# Patient Record
Sex: MALE | Race: WHITE | HISPANIC OR LATINO
[De-identification: names, ages, dates, MRNs, and addresses within clinical notes are randomized per-mention and may not be internally consistent; named-entity substitution may affect disease eponyms.]

---

## 2020-12-09 ENCOUNTER — OFFICE VISIT (OUTPATIENT)
Age: 55
End: 2020-12-09

## 2020-12-10 ENCOUNTER — TELEPHONE ENCOUNTER (OUTPATIENT)
Dept: URBAN - METROPOLITAN AREA CLINIC 9 | Facility: CLINIC | Age: 55
End: 2020-12-10

## 2021-01-20 ENCOUNTER — APPOINTMENT (RX ONLY)
Dept: URBAN - METROPOLITAN AREA CLINIC 147 | Facility: CLINIC | Age: 56
Setting detail: DERMATOLOGY
End: 2021-01-20

## 2021-01-20 VITALS — TEMPERATURE: 97 F

## 2021-01-20 DIAGNOSIS — L82.0 INFLAMED SEBORRHEIC KERATOSIS: ICD-10-CM

## 2021-01-20 DIAGNOSIS — L73.8 OTHER SPECIFIED FOLLICULAR DISORDERS: ICD-10-CM

## 2021-01-20 PROCEDURE — 99202 OFFICE O/P NEW SF 15 MIN: CPT | Mod: 25

## 2021-01-20 PROCEDURE — 17110 DESTRUCTION B9 LES UP TO 14: CPT

## 2021-01-20 PROCEDURE — ? COUNSELING

## 2021-01-20 PROCEDURE — ? LIQUID NITROGEN

## 2021-01-20 ASSESSMENT — LOCATION SIMPLE DESCRIPTION DERM
LOCATION SIMPLE: RIGHT FOREHEAD
LOCATION SIMPLE: SCALP

## 2021-01-20 ASSESSMENT — PAIN INTENSITY VAS: HOW INTENSE IS YOUR PAIN 0 BEING NO PAIN, 10 BEING THE MOST SEVERE PAIN POSSIBLE?: 3/10 PAIN

## 2021-01-20 ASSESSMENT — LOCATION DETAILED DESCRIPTION DERM
LOCATION DETAILED: RIGHT CENTRAL FRONTAL SCALP
LOCATION DETAILED: RIGHT MEDIAL FOREHEAD

## 2021-01-20 ASSESSMENT — LOCATION ZONE DERM
LOCATION ZONE: SCALP
LOCATION ZONE: FACE

## 2021-01-27 ENCOUNTER — OFFICE VISIT (OUTPATIENT)
Dept: URBAN - METROPOLITAN AREA SURGERY CENTER 5 | Facility: SURGERY CENTER | Age: 56
End: 2021-01-27

## 2021-02-15 ENCOUNTER — APPOINTMENT (RX ONLY)
Dept: URBAN - METROPOLITAN AREA CLINIC 148 | Facility: CLINIC | Age: 56
Setting detail: DERMATOLOGY
End: 2021-02-15

## 2021-02-15 VITALS — TEMPERATURE: 97.7 F

## 2021-02-15 DIAGNOSIS — L82.0 INFLAMED SEBORRHEIC KERATOSIS: ICD-10-CM

## 2021-02-15 PROCEDURE — 17110 DESTRUCTION B9 LES UP TO 14: CPT

## 2021-02-15 PROCEDURE — ? LIQUID NITROGEN

## 2021-02-15 PROCEDURE — ? ADDITIONAL NOTES

## 2021-02-15 ASSESSMENT — LOCATION SIMPLE DESCRIPTION DERM: LOCATION SIMPLE: RIGHT TEMPLE

## 2021-02-15 ASSESSMENT — LOCATION DETAILED DESCRIPTION DERM: LOCATION DETAILED: RIGHT LATERAL TEMPLE

## 2021-02-15 ASSESSMENT — LOCATION ZONE DERM: LOCATION ZONE: FACE

## 2021-02-15 NOTE — PROCEDURE: ADDITIONAL NOTES
Additional Notes: Patient consent was obtained to proceed with the visit and recommended plan of care after discussion of all risks and benefits, including the risks of COVID-19 exposure.
Detail Level: Simple
Render Risk Assessment In Note?: yes
Additional Notes: The inferior poll of this lesion was retreated to take because part of it remains
Detail Level: Detailed

## 2022-07-09 ENCOUNTER — TELEPHONE ENCOUNTER (OUTPATIENT)
Dept: URBAN - METROPOLITAN AREA CLINIC 121 | Facility: CLINIC | Age: 57
End: 2022-07-09

## 2022-07-10 ENCOUNTER — TELEPHONE ENCOUNTER (OUTPATIENT)
Dept: URBAN - METROPOLITAN AREA CLINIC 121 | Facility: CLINIC | Age: 57
End: 2022-07-10

## 2022-07-30 ENCOUNTER — TELEPHONE ENCOUNTER (OUTPATIENT)
Age: 57
End: 2022-07-30

## 2022-07-30 RX ORDER — CHOLESTYRAMINE 4 G/9G
1 (ONE) POWDER, FOR SUSPENSION ORAL DAILY
Qty: 0 | Refills: 16 | OUTPATIENT
Start: 2017-11-28 | End: 2020-12-09

## 2022-07-30 RX ORDER — CHOLESTYRAMINE 4 G/9G
1 (ONE) POWDER, FOR SUSPENSION ORAL
Qty: 0 | Refills: 5 | OUTPATIENT
Start: 2017-10-03 | End: 2017-11-28

## 2022-07-31 ENCOUNTER — TELEPHONE ENCOUNTER (OUTPATIENT)
Age: 57
End: 2022-07-31

## 2022-07-31 RX ORDER — CHOLESTYRAMINE 4 G/9G
1 (ONE) POWDER, FOR SUSPENSION ORAL
Qty: 0 | Refills: 5 | Status: ACTIVE | COMMUNITY
Start: 2017-10-03

## 2022-07-31 RX ORDER — CHOLESTYRAMINE 4 G/9G
1 (ONE) POWDER, FOR SUSPENSION ORAL DAILY
Qty: 0 | Refills: 16 | Status: ACTIVE | COMMUNITY
Start: 2017-11-28

## 2022-08-01 ENCOUNTER — P2P PATIENT RECORD (OUTPATIENT)
Age: 57
End: 2022-08-01

## 2022-08-04 ENCOUNTER — TELEPHONE ENCOUNTER (OUTPATIENT)
Dept: URBAN - METROPOLITAN AREA CLINIC 63 | Facility: CLINIC | Age: 57
End: 2022-08-04

## 2023-04-20 ENCOUNTER — OFFICE VISIT (OUTPATIENT)
Dept: URBAN - METROPOLITAN AREA CLINIC 60 | Facility: CLINIC | Age: 58
End: 2023-04-20
Payer: COMMERCIAL

## 2023-04-20 ENCOUNTER — WEB ENCOUNTER (OUTPATIENT)
Dept: URBAN - METROPOLITAN AREA CLINIC 60 | Facility: CLINIC | Age: 58
End: 2023-04-20

## 2023-04-20 VITALS
RESPIRATION RATE: 12 BRPM | DIASTOLIC BLOOD PRESSURE: 94 MMHG | SYSTOLIC BLOOD PRESSURE: 142 MMHG | BODY MASS INDEX: 31.04 KG/M2 | WEIGHT: 209.6 LBS | HEART RATE: 68 BPM | OXYGEN SATURATION: 97 % | TEMPERATURE: 99.1 F | HEIGHT: 69 IN

## 2023-04-20 DIAGNOSIS — R19.7 DIARRHEA, UNSPECIFIED TYPE: ICD-10-CM

## 2023-04-20 PROCEDURE — 99203 OFFICE O/P NEW LOW 30 MIN: CPT | Performed by: NURSE PRACTITIONER

## 2023-04-20 RX ORDER — ZOLPIDEM TARTRATE 10 MG/1
1 TABLET AT BEDTIME AS NEEDED TABLET, FILM COATED ORAL ONCE A DAY
Status: ACTIVE | COMMUNITY

## 2023-04-20 RX ORDER — LOSARTAN POTASSIUM 100 MG/1
1 TABLET TABLET ORAL ONCE A DAY
Status: ACTIVE | COMMUNITY

## 2023-04-20 RX ORDER — ALPRAZOLAM 0.5 MG/1
1 TABLET TABLET ORAL TWICE A DAY
Status: ACTIVE | COMMUNITY

## 2023-04-20 RX ORDER — OMEGA-3 FATTY ACIDS/FISH OIL 300-1000MG
1 CAPSULE CAPSULE ORAL ONCE A DAY
Status: ACTIVE | COMMUNITY

## 2023-04-20 RX ORDER — BUPROPION HYDROCHLORIDE 150 MG/1
1 TABLET IN THE MORNING TABLET, FILM COATED, EXTENDED RELEASE ORAL ONCE A DAY
Status: ACTIVE | COMMUNITY

## 2023-04-20 NOTE — HPI-PREVIOUS PROCEDURES
Colonoscopy/27 January 2021 (Danny Lorenzo Jr., MD). Four 5-8 mm polyps in the sigmoid colon, in the descending colon and in the transverse colon, removed with hot snare.  Diverticulosis in the sigmoid colon.  Internal hemorrhoids present.  Transverse and descending colon polyps are tubular adenomas.  Sigmoid colon polyp was hyperplastic.  Recommend repeat colonoscopy in 3 years due to multiple adenomatous polyps removed on this procedure. ********** Colonoscopy/23 August 2017 (Danny Lorenzo Jr., MD).  5 mm tubular adenomatous polyp removed from the sigmoid colon.  Diverticulosis in the sigmoid colon with internal hemorrhoids present.  All remaining findings are negative or benign.  Recommend repeat colonoscopy in 3 years.

## 2023-04-20 NOTE — HPI-HPI
Thank you very much for kindly referring Haroon Antunez, a very pleasant 58-year-old male, to our service due to diarrhea.  Past medical history is significant for HTN, anxiety, nephrolithiasis, sleep apnea (CPAP), colon polyps and diverticulosis.  Past surgical history is none.  His last complete colonoscopy was 27 January 2021 and was significant for multiple tubular adenomatous and hyperplastic polyps and left-sided diverticulosis.  He is not due for surveillance colonoscopy until January 2024.  He denies a family history of colon polyps, colon cancer or other GI malignancy.  Haroon presents today complaining of longstanding diarrhea, averaging 5-6 bowel movements per day of soft stool on a "good day" and up to 10-12 bowel movements per day of loose/liquid stool on a bad day.  He will use Imodium and/or Lomotil with good efficacy but does not use it on a regular basis.  He does admit to being dairy intolerant but still uses dairy products on a regular basis.  Additionally, he states that eggs and milk will, "go right through me".  He is otherwise asymptomatic from a GI perspective and denies pyrosis, dysphagia, dyspepsia, abdominal pain, blood per rectum, melena or unintentional weight loss.  In fact, he states he gained 30 pounds since the pandemic.  Of note, he states these bowel habits are unchanged since 2014 and were present during his previous 2 colonoscopies.

## 2023-05-04 LAB
CALPROTECTIN, FECAL: 11
CAMPYLOBACTER SPP. AG,EIA: (no result)
CLOSTRIDIUM DIFFICILE: (no result)
CRYPTOSPORIDIUM ANTIGEN,: (no result)
FECAL FAT, QUALITATIVE: (no result)
GIARDIA AG, EIA, STOOL: (no result)
LACTOFERRIN, FECAL, QUANT.: <6.25
LEUKOCYTES: (no result)
OVA AND PARASITES, CONC AND PERM SMEAR: (no result)
PANCREATIC ELASTASE, FECAL: 284
ROTAVIRUS AG, EIA: (no result)
SALMONELLA AND SHIGELLA, CULTURE: (no result)
SHIGA TOXINS, EIA W/RFL TO E.COLI O157 CULTURE: (no result)

## 2023-05-18 ENCOUNTER — WEB ENCOUNTER (OUTPATIENT)
Dept: URBAN - METROPOLITAN AREA CLINIC 60 | Facility: CLINIC | Age: 58
End: 2023-05-18

## 2023-05-18 ENCOUNTER — OFFICE VISIT (OUTPATIENT)
Dept: URBAN - METROPOLITAN AREA CLINIC 60 | Facility: CLINIC | Age: 58
End: 2023-05-18

## 2023-05-18 ENCOUNTER — OFFICE VISIT (OUTPATIENT)
Dept: URBAN - METROPOLITAN AREA CLINIC 60 | Facility: CLINIC | Age: 58
End: 2023-05-18
Payer: COMMERCIAL

## 2023-05-18 VITALS
SYSTOLIC BLOOD PRESSURE: 136 MMHG | BODY MASS INDEX: 30.96 KG/M2 | WEIGHT: 209 LBS | HEART RATE: 94 BPM | TEMPERATURE: 98.2 F | OXYGEN SATURATION: 99 % | DIASTOLIC BLOOD PRESSURE: 88 MMHG | HEIGHT: 69 IN

## 2023-05-18 DIAGNOSIS — R19.7 DIARRHEA, UNSPECIFIED TYPE: ICD-10-CM

## 2023-05-18 PROCEDURE — 99213 OFFICE O/P EST LOW 20 MIN: CPT | Performed by: NURSE PRACTITIONER

## 2023-05-18 RX ORDER — BUPROPION HYDROCHLORIDE 150 MG/1
1 TABLET IN THE MORNING TABLET, FILM COATED, EXTENDED RELEASE ORAL ONCE A DAY
Status: ACTIVE | COMMUNITY

## 2023-05-18 RX ORDER — ONDANSETRON HYDROCHLORIDE 4 MG/1
1 TABLET TABLET, FILM COATED ORAL
Qty: 2 | Refills: 0 | OUTPATIENT
Start: 2023-05-18

## 2023-05-18 RX ORDER — ZOLPIDEM TARTRATE 10 MG/1
1 TABLET AT BEDTIME AS NEEDED TABLET, FILM COATED ORAL ONCE A DAY
Status: ACTIVE | COMMUNITY

## 2023-05-18 RX ORDER — ALPRAZOLAM 0.5 MG/1
1 TABLET TABLET ORAL TWICE A DAY
Status: ACTIVE | COMMUNITY

## 2023-05-18 RX ORDER — LOSARTAN POTASSIUM 100 MG/1
1 TABLET TABLET ORAL ONCE A DAY
Status: ACTIVE | COMMUNITY

## 2023-05-18 RX ORDER — OMEGA-3 FATTY ACIDS/FISH OIL 300-1000MG
1 CAPSULE CAPSULE ORAL ONCE A DAY
Status: ACTIVE | COMMUNITY

## 2023-05-18 NOTE — HPI-PREVIOUS LABS
Stool studies dated 25 April 2023 are all negative or within normal limits.  Full results listed below.

## 2023-05-18 NOTE — HPI-HPI
Haroon Antunez is a very pleasant 58-year-old male seen in 3 weeks follow up of diarrhea. Past medical history is significant for HTN, anxiety, nephrolithiasis, sleep apnea (CPAP), colon polyps and diverticulosis. Past surgical history is none. His last complete colonoscopy was 27 January 2021 and was significant for multiple tubular adenomatous and hyperplastic polyps and left-sided diverticulosis. He is not due for surveillance colonoscopy until January 2024. He denies a family history of colon polyps, colon cancer or other GI malignancy.  Haroon presents today complaining of continued, longstanding, diarrhea averaging 5-10 bowel movements per day of soft stool and denies dairy avoidance since he was in [place] for the past 3 weeks.  He is otherwise asymptomatic from a GI perspective and denies pyrosis, dysphagia, dyspepsia, abdominal pain, change in bowel habits, rectal bleeding, melena or unintentional weight loss.  Of note, his bowel habits have remained unchanged since 2014 and were present during his last 2 previous colonoscopies.  Stool studies are all benign.

## 2023-05-25 ENCOUNTER — LAB OUTSIDE AN ENCOUNTER (OUTPATIENT)
Dept: URBAN - METROPOLITAN AREA CLINIC 60 | Facility: CLINIC | Age: 58
End: 2023-05-25

## 2023-05-26 ENCOUNTER — CLAIMS CREATED FROM THE CLAIM WINDOW (OUTPATIENT)
Dept: URBAN - METROPOLITAN AREA SURGERY CENTER 4 | Facility: SURGERY CENTER | Age: 58
End: 2023-05-26
Payer: COMMERCIAL

## 2023-05-26 DIAGNOSIS — K62.89 OTHER SPECIFIED DISEASES OF ANUS AND RECTUM: ICD-10-CM

## 2023-05-26 DIAGNOSIS — Z86.010 COLON POLYP HISTORY: ICD-10-CM

## 2023-05-26 DIAGNOSIS — R19.7 DIARRHEA, UNSPECIFIED TYPE: ICD-10-CM

## 2023-05-26 DIAGNOSIS — D12.4 POLYP OF DESCENDING COLON: ICD-10-CM

## 2023-05-26 PROCEDURE — 45380 COLONOSCOPY AND BIOPSY: CPT | Performed by: INTERNAL MEDICINE

## 2023-05-26 RX ORDER — LOSARTAN POTASSIUM 100 MG/1
1 TABLET TABLET ORAL ONCE A DAY
Status: ACTIVE | COMMUNITY

## 2023-05-26 RX ORDER — ONDANSETRON HYDROCHLORIDE 4 MG/1
1 TABLET TABLET, FILM COATED ORAL
Qty: 2 | Refills: 0 | Status: ACTIVE | COMMUNITY
Start: 2023-05-18

## 2023-05-26 RX ORDER — ALPRAZOLAM 0.5 MG/1
1 TABLET TABLET ORAL TWICE A DAY
Status: ACTIVE | COMMUNITY

## 2023-05-26 RX ORDER — ZOLPIDEM TARTRATE 10 MG/1
1 TABLET AT BEDTIME AS NEEDED TABLET, FILM COATED ORAL ONCE A DAY
Status: ACTIVE | COMMUNITY

## 2023-05-26 RX ORDER — BUPROPION HYDROCHLORIDE 150 MG/1
1 TABLET IN THE MORNING TABLET, FILM COATED, EXTENDED RELEASE ORAL ONCE A DAY
Status: ACTIVE | COMMUNITY

## 2023-05-26 RX ORDER — OMEGA-3 FATTY ACIDS/FISH OIL 300-1000MG
1 CAPSULE CAPSULE ORAL ONCE A DAY
Status: ACTIVE | COMMUNITY

## 2023-06-22 ENCOUNTER — OFFICE VISIT (OUTPATIENT)
Dept: URBAN - METROPOLITAN AREA CLINIC 60 | Facility: CLINIC | Age: 58
End: 2023-06-22
Payer: COMMERCIAL

## 2023-06-22 ENCOUNTER — DASHBOARD ENCOUNTERS (OUTPATIENT)
Age: 58
End: 2023-06-22

## 2023-06-22 VITALS
OXYGEN SATURATION: 97 % | WEIGHT: 210.2 LBS | TEMPERATURE: 97.8 F | HEIGHT: 69 IN | BODY MASS INDEX: 31.13 KG/M2 | HEART RATE: 66 BPM | DIASTOLIC BLOOD PRESSURE: 88 MMHG | SYSTOLIC BLOOD PRESSURE: 134 MMHG

## 2023-06-22 DIAGNOSIS — K64.1 GRADE II HEMORRHOIDS: ICD-10-CM

## 2023-06-22 DIAGNOSIS — E73.9 ADULT LACTASE DEFICIENCY: ICD-10-CM

## 2023-06-22 DIAGNOSIS — D12.4 ADENOMATOUS POLYP OF DESCENDING COLON: ICD-10-CM

## 2023-06-22 PROCEDURE — 99213 OFFICE O/P EST LOW 20 MIN: CPT | Performed by: NURSE PRACTITIONER

## 2023-06-22 RX ORDER — BUPROPION HYDROCHLORIDE 150 MG/1
1 TABLET IN THE MORNING TABLET, FILM COATED, EXTENDED RELEASE ORAL ONCE A DAY
COMMUNITY

## 2023-06-22 RX ORDER — ZOLPIDEM TARTRATE 10 MG/1
1 TABLET AT BEDTIME AS NEEDED TABLET, FILM COATED ORAL ONCE A DAY
COMMUNITY

## 2023-06-22 RX ORDER — ALPRAZOLAM 0.5 MG/1
1 TABLET TABLET ORAL TWICE A DAY
COMMUNITY

## 2023-06-22 RX ORDER — OMEGA-3 FATTY ACIDS/FISH OIL 300-1000MG
1 CAPSULE CAPSULE ORAL ONCE A DAY
COMMUNITY

## 2023-06-22 RX ORDER — LOSARTAN POTASSIUM 100 MG/1
1 TABLET TABLET ORAL ONCE A DAY
COMMUNITY

## 2023-06-22 NOTE — HPI-HPI
Haroon Antunez is a very pleasant 58-year-old male seen in follow-up of colonoscopy secondary to diarrhea (see results below).  He admits to tolerating the procedure very easily without any postprocedure complications.  His bowel habits have returned to normal and he presents today without gastrointestinal complaint.  Of note, he did find avoiding dairy products to be helpful.

## 2023-06-22 NOTE — HPI-PREVIOUS PROCEDURES
Colonoscopy/26 May 2023.  Unremarkable terminal ileum.  6 mm tubular adenomatous polyp removed from the mid descending colon.  Mild proctitis found in the rectum.  Diverticulosis in the descending and sigmoid colon with internal hemorrhoids present.  Biopsies from the terminal ileum, ascending colon and rectum all demonstrate benign mucosa without significant distortion or inflammation.  All remaining findings are negative or benign.  Recommend repeat colonoscopy in 5 years due to small adenomatous polyp removed on this procedure. ********** Colonoscopy/27 January 2021 (Danny Lorenzo Jr., MD). Four 5-8 mm polyps in the sigmoid colon, in the descending colon and in the transverse colon, removed with hot snare.  Diverticulosis in the sigmoid colon.  Internal hemorrhoids present.  Transverse and descending colon polyps are tubular adenomas.  Sigmoid colon polyp was hyperplastic.  Recommend repeat colonoscopy in 3 years due to multiple adenomatous polyps removed on this procedure. ********** Colonoscopy/23 August 2017 (Danny Lorenzo Jr., MD).  5 mm tubular adenomatous polyp removed from the sigmoid colon.  Diverticulosis in the sigmoid colon with internal hemorrhoids present.  All remaining findings are negative or benign.  Recommend repeat colonoscopy in 3 years.